# Patient Record
Sex: FEMALE | Race: WHITE | NOT HISPANIC OR LATINO
[De-identification: names, ages, dates, MRNs, and addresses within clinical notes are randomized per-mention and may not be internally consistent; named-entity substitution may affect disease eponyms.]

---

## 2017-03-02 ENCOUNTER — CLINICAL ADVICE (OUTPATIENT)
Age: 77
End: 2017-03-02

## 2017-04-08 ENCOUNTER — INPATIENT (INPATIENT)
Facility: HOSPITAL | Age: 77
LOS: 1 days | Discharge: ANOTHER HOSPITAL | End: 2017-04-10
Attending: HOSPITALIST | Admitting: HOSPITALIST
Payer: MEDICARE

## 2017-04-08 VITALS
OXYGEN SATURATION: 99 % | SYSTOLIC BLOOD PRESSURE: 105 MMHG | DIASTOLIC BLOOD PRESSURE: 48 MMHG | TEMPERATURE: 98 F | HEART RATE: 128 BPM | RESPIRATION RATE: 20 BRPM

## 2017-04-08 DIAGNOSIS — C54.1 MALIGNANT NEOPLASM OF ENDOMETRIUM: ICD-10-CM

## 2017-04-08 DIAGNOSIS — Z71.89 OTHER SPECIFIED COUNSELING: ICD-10-CM

## 2017-04-08 DIAGNOSIS — D63.0 ANEMIA IN NEOPLASTIC DISEASE: ICD-10-CM

## 2017-04-08 DIAGNOSIS — A41.9 SEPSIS, UNSPECIFIED ORGANISM: ICD-10-CM

## 2017-04-08 DIAGNOSIS — Z78.9 OTHER SPECIFIED HEALTH STATUS: Chronic | ICD-10-CM

## 2017-04-08 DIAGNOSIS — E34.9 ENDOCRINE DISORDER, UNSPECIFIED: ICD-10-CM

## 2017-04-08 DIAGNOSIS — G93.40 ENCEPHALOPATHY, UNSPECIFIED: ICD-10-CM

## 2017-04-08 DIAGNOSIS — R00.0 TACHYCARDIA, UNSPECIFIED: ICD-10-CM

## 2017-04-08 LAB
ALBUMIN SERPL ELPH-MCNC: 3 G/DL — LOW (ref 3.3–5)
ALP SERPL-CCNC: 133 U/L — HIGH (ref 40–120)
ALT FLD-CCNC: 17 U/L — SIGNIFICANT CHANGE UP (ref 4–33)
APPEARANCE UR: CLEAR — SIGNIFICANT CHANGE UP
AST SERPL-CCNC: 55 U/L — HIGH (ref 4–32)
BASE EXCESS BLDV CALC-SCNC: 2.6 MMOL/L — SIGNIFICANT CHANGE UP
BASOPHILS # BLD AUTO: 0 K/UL — SIGNIFICANT CHANGE UP (ref 0–0.2)
BASOPHILS NFR BLD AUTO: 0 % — SIGNIFICANT CHANGE UP (ref 0–2)
BASOPHILS NFR SPEC: 0 % — SIGNIFICANT CHANGE UP (ref 0–2)
BILIRUB SERPL-MCNC: 0.4 MG/DL — SIGNIFICANT CHANGE UP (ref 0.2–1.2)
BILIRUB UR-MCNC: NEGATIVE — SIGNIFICANT CHANGE UP
BLOOD GAS VENOUS - CREATININE: 0.43 MG/DL — LOW (ref 0.5–1.3)
BLOOD UR QL VISUAL: HIGH
BUN SERPL-MCNC: 15 MG/DL — SIGNIFICANT CHANGE UP (ref 7–23)
CA-I BLD-SCNC: 1.13 MMOL/L — SIGNIFICANT CHANGE UP (ref 1.03–1.23)
CALCIUM SERPL-MCNC: 9.7 MG/DL — SIGNIFICANT CHANGE UP (ref 8.4–10.5)
CHLORIDE BLDV-SCNC: 106 MMOL/L — SIGNIFICANT CHANGE UP (ref 96–108)
CHLORIDE SERPL-SCNC: 97 MMOL/L — LOW (ref 98–107)
CO2 SERPL-SCNC: 22 MMOL/L — SIGNIFICANT CHANGE UP (ref 22–31)
COLOR SPEC: SIGNIFICANT CHANGE UP
CREAT SERPL-MCNC: 0.46 MG/DL — LOW (ref 0.5–1.3)
EOSINOPHIL # BLD AUTO: 0.01 K/UL — SIGNIFICANT CHANGE UP (ref 0–0.5)
EOSINOPHIL NFR BLD AUTO: 0.1 % — SIGNIFICANT CHANGE UP (ref 0–6)
EOSINOPHIL NFR FLD: 0 % — SIGNIFICANT CHANGE UP (ref 0–6)
GAS PNL BLDV: 129 MMOL/L — LOW (ref 136–146)
GIANT PLATELETS BLD QL SMEAR: PRESENT — SIGNIFICANT CHANGE UP
GLUCOSE BLDV-MCNC: 108 — HIGH (ref 70–99)
GLUCOSE SERPL-MCNC: 102 MG/DL — HIGH (ref 70–99)
GLUCOSE UR-MCNC: NEGATIVE — SIGNIFICANT CHANGE UP
HCO3 BLDV-SCNC: 27 MMOL/L — SIGNIFICANT CHANGE UP (ref 20–27)
HCT VFR BLD CALC: 25.8 % — LOW (ref 34.5–45)
HCT VFR BLDV CALC: 27.4 % — LOW (ref 34.5–45)
HGB BLD-MCNC: 8.8 G/DL — LOW (ref 11.5–15.5)
HGB BLDV-MCNC: 8.8 G/DL — LOW (ref 11.5–15.5)
IMM GRANULOCYTES NFR BLD AUTO: 0.2 % — SIGNIFICANT CHANGE UP (ref 0–1.5)
KETONES UR-MCNC: NEGATIVE — SIGNIFICANT CHANGE UP
LACTATE BLDV-MCNC: 1.7 MMOL/L — SIGNIFICANT CHANGE UP (ref 0.5–2)
LACTATE SERPL-SCNC: 1.5 MMOL/L — SIGNIFICANT CHANGE UP (ref 0.5–2)
LEUKOCYTE ESTERASE UR-ACNC: NEGATIVE — SIGNIFICANT CHANGE UP
LYMPHOCYTES # BLD AUTO: 0.4 K/UL — LOW (ref 1–3.3)
LYMPHOCYTES # BLD AUTO: 4.7 % — LOW (ref 13–44)
LYMPHOCYTES NFR SPEC AUTO: 3 % — LOW (ref 13–44)
MACROCYTES BLD QL: SLIGHT — SIGNIFICANT CHANGE UP
MAGNESIUM SERPL-MCNC: 2.1 MG/DL — SIGNIFICANT CHANGE UP (ref 1.6–2.6)
MANUAL SMEAR VERIFICATION: SIGNIFICANT CHANGE UP
MCHC RBC-ENTMCNC: 31.3 PG — SIGNIFICANT CHANGE UP (ref 27–34)
MCHC RBC-ENTMCNC: 34.1 % — SIGNIFICANT CHANGE UP (ref 32–36)
MCV RBC AUTO: 91.8 FL — SIGNIFICANT CHANGE UP (ref 80–100)
METAMYELOCYTES # FLD: 2 % — HIGH (ref 0–1)
MONOCYTES # BLD AUTO: 0.06 K/UL — SIGNIFICANT CHANGE UP (ref 0–0.9)
MONOCYTES NFR BLD AUTO: 0.7 % — LOW (ref 2–14)
MONOCYTES NFR BLD: 1 % — LOW (ref 2–9)
MUCOUS THREADS # UR AUTO: SIGNIFICANT CHANGE UP
MYELOCYTES NFR BLD: 1 % — HIGH (ref 0–0)
NEUTROPHIL AB SER-ACNC: 91 % — HIGH (ref 43–77)
NEUTROPHILS # BLD AUTO: 8.09 K/UL — HIGH (ref 1.8–7.4)
NEUTROPHILS NFR BLD AUTO: 94.3 % — HIGH (ref 43–77)
NEUTS BAND # BLD: 2 % — SIGNIFICANT CHANGE UP (ref 0–6)
NITRITE UR-MCNC: NEGATIVE — SIGNIFICANT CHANGE UP
PCO2 BLDV: 35 MMHG — LOW (ref 41–51)
PH BLDV: 7.49 PH — HIGH (ref 7.32–7.43)
PH UR: 7 — SIGNIFICANT CHANGE UP (ref 4.6–8)
PHOSPHATE SERPL-MCNC: 1.5 MG/DL — LOW (ref 2.5–4.5)
PLATELET # BLD AUTO: 361 K/UL — SIGNIFICANT CHANGE UP (ref 150–400)
PLATELET CLUMP BLD QL SMEAR: SIGNIFICANT CHANGE UP
PLATELET COUNT - ESTIMATE: NORMAL — SIGNIFICANT CHANGE UP
PMV BLD: 10.5 FL — SIGNIFICANT CHANGE UP (ref 7–13)
PO2 BLDV: 48 MMHG — HIGH (ref 35–40)
POTASSIUM BLDV-SCNC: 3.8 MMOL/L — SIGNIFICANT CHANGE UP (ref 3.4–4.5)
POTASSIUM SERPL-MCNC: 4.8 MMOL/L — SIGNIFICANT CHANGE UP (ref 3.5–5.3)
POTASSIUM SERPL-SCNC: 4.8 MMOL/L — SIGNIFICANT CHANGE UP (ref 3.5–5.3)
PROT SERPL-MCNC: 6.2 G/DL — SIGNIFICANT CHANGE UP (ref 6–8.3)
PROT UR-MCNC: NEGATIVE — SIGNIFICANT CHANGE UP
PTH-INTACT SERPL-MCNC: 102.6 PG/ML — HIGH (ref 15–65)
RBC # BLD: 2.81 M/UL — LOW (ref 3.8–5.2)
RBC # FLD: 16.5 % — HIGH (ref 10.3–14.5)
RBC CASTS # UR COMP ASSIST: HIGH (ref 0–?)
SAO2 % BLDV: 85.4 % — HIGH (ref 60–85)
SODIUM SERPL-SCNC: 137 MMOL/L — SIGNIFICANT CHANGE UP (ref 135–145)
SP GR SPEC: 1.01 — SIGNIFICANT CHANGE UP (ref 1–1.03)
TSH SERPL-MCNC: 2.03 UIU/ML — SIGNIFICANT CHANGE UP (ref 0.27–4.2)
UROBILINOGEN FLD QL: NORMAL E.U. — SIGNIFICANT CHANGE UP (ref 0.1–0.2)
WBC # BLD: 8.58 K/UL — SIGNIFICANT CHANGE UP (ref 3.8–10.5)
WBC # FLD AUTO: 8.58 K/UL — SIGNIFICANT CHANGE UP (ref 3.8–10.5)
WBC UR QL: SIGNIFICANT CHANGE UP (ref 0–?)

## 2017-04-08 PROCEDURE — 71010: CPT | Mod: 26

## 2017-04-08 PROCEDURE — 99223 1ST HOSP IP/OBS HIGH 75: CPT | Mod: GC

## 2017-04-08 RX ORDER — POLYETHYLENE GLYCOL 3350 17 G/17G
17 POWDER, FOR SOLUTION ORAL
Qty: 0 | Refills: 0 | COMMUNITY

## 2017-04-08 RX ORDER — VANCOMYCIN HCL 1 G
1000 VIAL (EA) INTRAVENOUS ONCE
Qty: 0 | Refills: 0 | Status: COMPLETED | OUTPATIENT
Start: 2017-04-08 | End: 2017-04-08

## 2017-04-08 RX ORDER — SODIUM CHLORIDE 9 MG/ML
2000 INJECTION INTRAMUSCULAR; INTRAVENOUS; SUBCUTANEOUS ONCE
Qty: 0 | Refills: 0 | Status: COMPLETED | OUTPATIENT
Start: 2017-04-08 | End: 2017-04-08

## 2017-04-08 RX ORDER — PANTOPRAZOLE SODIUM 20 MG/1
1 TABLET, DELAYED RELEASE ORAL
Qty: 0 | Refills: 0 | COMMUNITY

## 2017-04-08 RX ORDER — SODIUM CHLORIDE 9 MG/ML
1000 INJECTION, SOLUTION INTRAVENOUS
Qty: 0 | Refills: 0 | Status: DISCONTINUED | OUTPATIENT
Start: 2017-04-08 | End: 2017-04-09

## 2017-04-08 RX ORDER — MORPHINE SULFATE 50 MG/1
0 CAPSULE, EXTENDED RELEASE ORAL
Qty: 0 | Refills: 0 | COMMUNITY

## 2017-04-08 RX ORDER — FENTANYL CITRATE 50 UG/ML
1 INJECTION INTRAVENOUS
Qty: 0 | Refills: 0 | Status: DISCONTINUED | OUTPATIENT
Start: 2017-04-08 | End: 2017-04-09

## 2017-04-08 RX ORDER — DOCUSATE SODIUM 100 MG
25 CAPSULE ORAL
Qty: 0 | Refills: 0 | COMMUNITY

## 2017-04-08 RX ORDER — SODIUM CHLORIDE 9 MG/ML
500 INJECTION INTRAMUSCULAR; INTRAVENOUS; SUBCUTANEOUS ONCE
Qty: 0 | Refills: 0 | Status: COMPLETED | OUTPATIENT
Start: 2017-04-08 | End: 2017-04-08

## 2017-04-08 RX ORDER — PROCHLORPERAZINE MALEATE 5 MG
1 TABLET ORAL
Qty: 0 | Refills: 0 | COMMUNITY

## 2017-04-08 RX ORDER — SENNA PLUS 8.6 MG/1
1 TABLET ORAL
Qty: 0 | Refills: 0 | COMMUNITY

## 2017-04-08 RX ORDER — FENTANYL CITRATE 50 UG/ML
1 INJECTION INTRAVENOUS
Qty: 0 | Refills: 0 | Status: DISCONTINUED | OUTPATIENT
Start: 2017-04-08 | End: 2017-04-10

## 2017-04-08 RX ORDER — ONDANSETRON 8 MG/1
1 TABLET, FILM COATED ORAL
Qty: 0 | Refills: 0 | COMMUNITY

## 2017-04-08 RX ORDER — PIPERACILLIN AND TAZOBACTAM 4; .5 G/20ML; G/20ML
3.38 INJECTION, POWDER, LYOPHILIZED, FOR SOLUTION INTRAVENOUS EVERY 8 HOURS
Qty: 0 | Refills: 0 | Status: DISCONTINUED | OUTPATIENT
Start: 2017-04-08 | End: 2017-04-10

## 2017-04-08 RX ORDER — ACETAMINOPHEN 500 MG
650 TABLET ORAL EVERY 6 HOURS
Qty: 0 | Refills: 0 | Status: DISCONTINUED | OUTPATIENT
Start: 2017-04-08 | End: 2017-04-10

## 2017-04-08 RX ORDER — PIPERACILLIN AND TAZOBACTAM 4; .5 G/20ML; G/20ML
3.38 INJECTION, POWDER, LYOPHILIZED, FOR SOLUTION INTRAVENOUS ONCE
Qty: 0 | Refills: 0 | Status: COMPLETED | OUTPATIENT
Start: 2017-04-08 | End: 2017-04-09

## 2017-04-08 RX ORDER — ACETAMINOPHEN 500 MG
2 TABLET ORAL
Qty: 0 | Refills: 0 | COMMUNITY

## 2017-04-08 RX ORDER — MEROPENEM 1 G/30ML
1000 INJECTION INTRAVENOUS ONCE
Qty: 0 | Refills: 0 | Status: COMPLETED | OUTPATIENT
Start: 2017-04-08 | End: 2017-04-08

## 2017-04-08 RX ORDER — VANCOMYCIN HCL 1 G
1000 VIAL (EA) INTRAVENOUS EVERY 12 HOURS
Qty: 0 | Refills: 0 | Status: DISCONTINUED | OUTPATIENT
Start: 2017-04-09 | End: 2017-04-10

## 2017-04-08 RX ORDER — ENOXAPARIN SODIUM 100 MG/ML
40 INJECTION SUBCUTANEOUS EVERY 24 HOURS
Qty: 0 | Refills: 0 | Status: DISCONTINUED | OUTPATIENT
Start: 2017-04-08 | End: 2017-04-08

## 2017-04-08 RX ADMIN — Medication 63.75 MILLIMOLE(S): at 21:40

## 2017-04-08 RX ADMIN — Medication 250 MILLIGRAM(S): at 20:36

## 2017-04-08 RX ADMIN — MEROPENEM 200 MILLIGRAM(S): 1 INJECTION INTRAVENOUS at 19:42

## 2017-04-08 RX ADMIN — SODIUM CHLORIDE 500 MILLILITER(S): 9 INJECTION INTRAMUSCULAR; INTRAVENOUS; SUBCUTANEOUS at 22:06

## 2017-04-08 RX ADMIN — SODIUM CHLORIDE 4000 MILLILITER(S): 9 INJECTION INTRAMUSCULAR; INTRAVENOUS; SUBCUTANEOUS at 19:27

## 2017-04-08 NOTE — ED PROVIDER NOTE - ATTENDING CONTRIBUTION TO CARE
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  Pt noted to be more unresponsive than usual this evening and febrile at home.  Pt with L upper chest port and indwelling alanis.  Family reports decreased UOP yesterday, but normal today.  Pt with waxing and waning delirium since her admission, but today had a period where she was not responding to voice or physical stimuli.  Pt received 300cc IVFs by EMS and is now awake and at her baseline MS.  No rash, apparent pain, vomiting, diarrhea.  Pt with cough at home.  Thin, ill appearing, lying comfortably in stretcher, awake mildly agitated, babbling incoherently, moving all extremities.  Febrile, tachy, normotensive.  Lungs cta bl with no increased work of breathing.  Cards nl S1/S2, tachy regular, no MRG.  Abd soft ntnd. (+)alanis in place.  No pedal edema or calf tenderness.  Septic, r/o neutropenic sepsis eval for pna vs uti, labs, ua, cxr, ivfs, broad spectrum abx, admit.

## 2017-04-08 NOTE — H&P ADULT. - PROBLEM SELECTOR PLAN 6
- likely secondary to underlying infection  - Well's Score for PE 4 with 16.2% chance of PE in ED population  - low threshold to obtain lower extremity dopplers/CT-Chest to rule out PE - likely secondary to underlying infection  - Well's Score for PE 4 with 16.2% chance of PE in ED population  - CT-Chest w contrast to rule out PE

## 2017-04-08 NOTE — H&P ADULT. - PROBLEM SELECTOR PLAN 3
- likely multifactorial (sepsis +/- leptomeningeal disease +/- metabolic encephalopathy)   - NPO for now   - Fall Precautions   - hold anti-psychotics - likely multifactorial (sepsis +/- leptomeningeal disease +/- metabolic encephalopathy)   - CT-Head Non-Contrast   - MRI-Head w/wo contrast to better evaluate brain metastases   - will consider EEG to rule out non-convulsive seizures   - NPO for now   - Fall Precautions   - hold anti-psychotics - likely multifactorial (sepsis +/- leptomeningeal disease +/- metabolic encephalopathy)   - CT-Head Non-Contrast   - MRI-Head w/wo contrast to better evaluate brain metastases   - will consider EEG to rule out non-convulsive seizures if other workup unrevealing  - NPO for now   - Fall Precautions   - hold anti-psychotics  - hold pharmacologic anti-coagulation given brain mets (increased risk of intracranial bleed...despite underlying malignancy and risk of VTE)

## 2017-04-08 NOTE — ED ADULT NURSE NOTE - CHIEF COMPLAINT QUOTE
pt brought in by ems/  family called because pt was unresponsive . pt has brain cancer and was recently treated with chem/ pt found to have scant amt of urine from [facility] last time this happened her calium was high pt has 20 g inleft  pt responsive to family

## 2017-04-08 NOTE — H&P ADULT. - PROBLEM SELECTOR PLAN 7
- will clarify patient's prognosis - will clarify patient's prognosis with Gyn Onc  - Palliative Care Team consult in AM

## 2017-04-08 NOTE — H&P ADULT. - FAMILY HISTORY
No pertinent family history in first degree relatives Sibling  Still living? Unknown  Family history of lymphoma, Age at diagnosis: Age Unknown

## 2017-04-08 NOTE — ED PROVIDER NOTE - OBJECTIVE STATEMENT
76F w/ recently diagnosed endometrial carcinoma w/ mets to lungs and brain, was discharged from [facility] 4 days ago. She was there for a month getting whole brain irradiation and chemo, last chemo 4/5. No infectious complications at [facility] but she had hypercalcemia of unknown etiology and received a bisphosphonate and calcitonin. Also was told she has pulm HTN. Has a port and alanis.  Since d/c was ok until 2 days ago when family noticed increasing confusion and decreased LOC, decreased PO intake. Possible dry cough for a day. Son is a nephrologist at [employer]. Family called EMS who found her BP to be about 80s systolic

## 2017-04-08 NOTE — H&P ADULT. - LAB RESULTS AND INTERPRETATION
Labs personally reviewed showing absence of leukocytosis (however neutrophil predominance, without bandemia); normocytic anemia; BMP notable for hypoalbuminemia (Alb 3); Alk Phos 133, AST 55; PTH-intact 102.6; Phos 1.5; lactate 1.7; UA negative nitrites/leukocyte esterase + trace blood

## 2017-04-08 NOTE — H&P ADULT. - COMMENTS
VS: T100.1, , /68, RR16, SpO2 98% on room air ROS could not be gathered; patient is currently somnolent, uncooperative with  history and physical.

## 2017-04-08 NOTE — H&P ADULT. - PROBLEM SELECTOR PLAN 2
- will follow-up with patient's Gyn Oncologist (Dr. Wojciech Cartwright - [facility]) in am   - consult in-house Gyn Onc - will follow-up with patient's Gyn Oncologist (Dr. Wojciech Cartwright - [facility]) in am   - consult in-house Gyn Onc in am  - obtain imaging, biopsy and treatment records from [facility]

## 2017-04-08 NOTE — H&P ADULT. - PMH
Endometrial carcinoma    Hypercalcemia of malignancy    Pulmonary hypertension Endometrial carcinoma    Hypercalcemia of malignancy    Osteoporosis    Pubic ramus fracture    Pulmonary hypertension

## 2017-04-08 NOTE — H&P ADULT. - PROBLEM SELECTOR PLAN 5
- likely anemia of chronic disease  - check iron studies (Fe, ferritin, TIBC, B12) in am   - continue to monitor CBC w/ differential daily (transfuse for Hg goal > 7)

## 2017-04-08 NOTE — ED ADULT TRIAGE NOTE - CHIEF COMPLAINT QUOTE
pt brought in by ems/  family called because pt was unresponsive . pt has brain cancer and was recently treated with chem/ pt found to have scant amt of urine from [facility] last time this happened her calium was high pt has 20 g inleft pt brought in by ems/  family called because pt was unresponsive . pt has brain cancer and was recently treated with chem/ pt found to have scant amt of urine from [facility] last time this happened her calium was high pt has 20 g inleft  pt responsive to family

## 2017-04-08 NOTE — H&P ADULT. - HISTORY OF PRESENT ILLNESS
On arrival to ED, VS: Tmax 100.1, -128, -134/48-68, RR16-20, SpO2 98% on room air. She received Meropenem 1000mg, Vancomycin 1000mg, NS 2L Ms. 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 Per the patient's son, who is at bedside, she was in her usual state of health until she sustained bilateral pubic rami fracture 3 months ago while in [place]. There, preliminary workup revealed that she had an endometrial mass, with biopsy showing poorly differentiated adenocarcinoma. She was hypoxic and tachycardic while hospitalized in [place], however per son, CTA-chest was negative for pulmonary embolism...CT, however did show pulmonary (karen-hilar) and multiple brain metastases. She did not undergo chemotherapy or radiation therapy while in [place]. She was transferred directly to [facility] approximately 6 weeks ago. While at [facility], she had repeat biopsy confirming poorly differentiated adenocarcinoma of the uterus. Patient was started on whole-brain radiotherapy (15 cycles) and started on a platinum-based chemotherapeutic regimen (completed 2 cycles). Patient's hospital course was complicated by hypercalcemia (Ca 17.6), attributed to her underlying malignancy, which resolved with Zometa and Calcitonin. Per son, patient was conversant, AOx3, ambulated with a walker prior to the start of chemotherapy. Since, she has had worsening somnolence/lethargy and was started on Zyprexa for delirium. The patient was discharged home 3 days ago, and has since been very lethargic, not eating much more than Ensures, now with dysarthria. Per son, patient has had indwelling alanis catheter since her admission to [facility] (with multiple failed trials of void). She was scheduled to received NS 1L three times weekly via the mediport (coordinated by infusion company). Her mediport was apparently accessed by the home health aide a few days ago. She has not had any nausea/vomiting. No diarrhea/hematochezia/melena. Her son has noticed a non-productive cough. Otherwise, no other focal infectious symptoms.     Patient is currently alert, but somnolent, uncooperative with history and physical.     On arrival to ED, VS: Tmax 100.1, -128, -134/48-68, RR16-20, SpO2 98% on room air. She received Meropenem 1000mg, Vancomycin 1000mg, NS 2.5L (in addition to the 1L she received by EMS).

## 2017-04-08 NOTE — H&P ADULT. - ASSESSMENT
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 CNS infection, less likely is UTI given clean UA) vs. sequela of radiation therapy to the brain vs. metastatic disease to the brain.

## 2017-04-08 NOTE — ED PROVIDER NOTE - MEDICAL DECISION MAKING DETAILS
76F w/ met endometrial ca on chemo presents with AMS and sepsis, suspect urinary source. Given fluids and abx per protocol. Cultures done before abx. Will adm to medicine.

## 2017-04-08 NOTE — H&P ADULT. - PROBLEM SELECTOR PLAN 4
- check PTH-rp   - continue to monitor daily BMP/Phos/Mg - check PTH-rp , Vitamin 25(OH) D   - continue to monitor daily BMP/Phos/Mg

## 2017-04-08 NOTE — H&P ADULT. - PROBLEM SELECTOR PLAN 1
- presumed diagnosis of underlying infection, requiring IVF resuscitation   - continue with broad-spectrum antibiotics (Vancomycin + Zosyn)  - check RVP   - follow-up BCx/UCx  - will consider LP - presumed diagnosis of underlying infection, requiring IVF resuscitation   - continue with broad-spectrum antibiotics (Vancomycin + Zosyn)  - check RVP   - follow-up BCx/UCx; check cultures from mediport  - will consider LP  - D5/NS @ 50ml/hr

## 2017-04-08 NOTE — H&P ADULT. - ATTENDING COMMENTS
Patient was seen and evaluated, agree with above    A/P: 76 y.o. woman with multiple medical co-morbidities now with sepsis of unclear etiology   - Continue with broad spectrum abx    # Hypophosphatemia    - Will add sodium phosphorus.

## 2017-04-09 LAB
ALBUMIN SERPL ELPH-MCNC: 3 G/DL — LOW (ref 3.3–5)
ALP SERPL-CCNC: 124 U/L — HIGH (ref 40–120)
ALT FLD-CCNC: 15 U/L — SIGNIFICANT CHANGE UP (ref 4–33)
APTT BLD: 29.7 SEC — SIGNIFICANT CHANGE UP (ref 27.5–37.4)
AST SERPL-CCNC: 37 U/L — HIGH (ref 4–32)
B PERT DNA SPEC QL NAA+PROBE: SIGNIFICANT CHANGE UP
BASOPHILS # BLD AUTO: 0.02 K/UL — SIGNIFICANT CHANGE UP (ref 0–0.2)
BASOPHILS NFR BLD AUTO: 0.4 % — SIGNIFICANT CHANGE UP (ref 0–2)
BILIRUB SERPL-MCNC: 0.7 MG/DL — SIGNIFICANT CHANGE UP (ref 0.2–1.2)
BUN SERPL-MCNC: 8 MG/DL — SIGNIFICANT CHANGE UP (ref 7–23)
C PNEUM DNA SPEC QL NAA+PROBE: NOT DETECTED — SIGNIFICANT CHANGE UP
CALCIUM SERPL-MCNC: 8.7 MG/DL — SIGNIFICANT CHANGE UP (ref 8.4–10.5)
CHLORIDE SERPL-SCNC: 99 MMOL/L — SIGNIFICANT CHANGE UP (ref 98–107)
CO2 SERPL-SCNC: 23 MMOL/L — SIGNIFICANT CHANGE UP (ref 22–31)
CREAT SERPL-MCNC: 0.33 MG/DL — LOW (ref 0.5–1.3)
EOSINOPHIL # BLD AUTO: 0.03 K/UL — SIGNIFICANT CHANGE UP (ref 0–0.5)
EOSINOPHIL NFR BLD AUTO: 0.6 % — SIGNIFICANT CHANGE UP (ref 0–6)
FERRITIN SERPL-MCNC: 2581 NG/ML — HIGH (ref 15–150)
FLUAV H1 2009 PAND RNA SPEC QL NAA+PROBE: NOT DETECTED — SIGNIFICANT CHANGE UP
FLUAV H1 RNA SPEC QL NAA+PROBE: NOT DETECTED — SIGNIFICANT CHANGE UP
FLUAV H3 RNA SPEC QL NAA+PROBE: NOT DETECTED — SIGNIFICANT CHANGE UP
FLUAV SUBTYP SPEC NAA+PROBE: SIGNIFICANT CHANGE UP
FLUBV RNA SPEC QL NAA+PROBE: NOT DETECTED — SIGNIFICANT CHANGE UP
GLUCOSE SERPL-MCNC: 90 MG/DL — SIGNIFICANT CHANGE UP (ref 70–99)
HADV DNA SPEC QL NAA+PROBE: NOT DETECTED — SIGNIFICANT CHANGE UP
HCOV 229E RNA SPEC QL NAA+PROBE: NOT DETECTED — SIGNIFICANT CHANGE UP
HCOV HKU1 RNA SPEC QL NAA+PROBE: NOT DETECTED — SIGNIFICANT CHANGE UP
HCOV NL63 RNA SPEC QL NAA+PROBE: NOT DETECTED — SIGNIFICANT CHANGE UP
HCOV OC43 RNA SPEC QL NAA+PROBE: NOT DETECTED — SIGNIFICANT CHANGE UP
HCT VFR BLD CALC: 25 % — LOW (ref 34.5–45)
HGB BLD-MCNC: 8.4 G/DL — LOW (ref 11.5–15.5)
HMPV RNA SPEC QL NAA+PROBE: NOT DETECTED — SIGNIFICANT CHANGE UP
HPIV1 RNA SPEC QL NAA+PROBE: NOT DETECTED — SIGNIFICANT CHANGE UP
HPIV2 RNA SPEC QL NAA+PROBE: NOT DETECTED — SIGNIFICANT CHANGE UP
HPIV3 RNA SPEC QL NAA+PROBE: NOT DETECTED — SIGNIFICANT CHANGE UP
HPIV4 RNA SPEC QL NAA+PROBE: NOT DETECTED — SIGNIFICANT CHANGE UP
IMM GRANULOCYTES NFR BLD AUTO: 1.3 % — SIGNIFICANT CHANGE UP (ref 0–1.5)
INR BLD: 1.03 — SIGNIFICANT CHANGE UP (ref 0.88–1.17)
IRON SATN MFR SERPL: 151 UG/DL — SIGNIFICANT CHANGE UP (ref 140–530)
IRON SATN MFR SERPL: 72 UG/DL — SIGNIFICANT CHANGE UP (ref 30–160)
LACTATE SERPL-SCNC: 0.9 MMOL/L — SIGNIFICANT CHANGE UP (ref 0.5–2)
LYMPHOCYTES # BLD AUTO: 0.35 K/UL — LOW (ref 1–3.3)
LYMPHOCYTES # BLD AUTO: 7.3 % — LOW (ref 13–44)
M PNEUMO DNA SPEC QL NAA+PROBE: NOT DETECTED — SIGNIFICANT CHANGE UP
MAGNESIUM SERPL-MCNC: 1.8 MG/DL — SIGNIFICANT CHANGE UP (ref 1.6–2.6)
MCHC RBC-ENTMCNC: 30.9 PG — SIGNIFICANT CHANGE UP (ref 27–34)
MCHC RBC-ENTMCNC: 33.6 % — SIGNIFICANT CHANGE UP (ref 32–36)
MCV RBC AUTO: 91.9 FL — SIGNIFICANT CHANGE UP (ref 80–100)
MONOCYTES # BLD AUTO: 0.05 K/UL — SIGNIFICANT CHANGE UP (ref 0–0.9)
MONOCYTES NFR BLD AUTO: 1 % — LOW (ref 2–14)
NEUTROPHILS # BLD AUTO: 4.29 K/UL — SIGNIFICANT CHANGE UP (ref 1.8–7.4)
NEUTROPHILS NFR BLD AUTO: 89.4 % — HIGH (ref 43–77)
PHOSPHATE SERPL-MCNC: 2.1 MG/DL — LOW (ref 2.5–4.5)
PLATELET # BLD AUTO: 314 K/UL — SIGNIFICANT CHANGE UP (ref 150–400)
PMV BLD: 10.2 FL — SIGNIFICANT CHANGE UP (ref 7–13)
POTASSIUM SERPL-MCNC: 3.4 MMOL/L — LOW (ref 3.5–5.3)
POTASSIUM SERPL-SCNC: 3.4 MMOL/L — LOW (ref 3.5–5.3)
PROT SERPL-MCNC: 5.8 G/DL — LOW (ref 6–8.3)
PROTHROM AB SERPL-ACNC: 11.6 SEC — SIGNIFICANT CHANGE UP (ref 9.8–13.1)
RBC # BLD: 2.72 M/UL — LOW (ref 3.8–5.2)
RBC # FLD: 16.5 % — HIGH (ref 10.3–14.5)
RSV RNA SPEC QL NAA+PROBE: NOT DETECTED — SIGNIFICANT CHANGE UP
RV+EV RNA SPEC QL NAA+PROBE: NOT DETECTED — SIGNIFICANT CHANGE UP
SODIUM SERPL-SCNC: 139 MMOL/L — SIGNIFICANT CHANGE UP (ref 135–145)
SPECIMEN SOURCE: SIGNIFICANT CHANGE UP
SPECIMEN SOURCE: SIGNIFICANT CHANGE UP
UIBC SERPL-MCNC: 79 UG/DL — LOW (ref 110–370)
VIT B12 SERPL-MCNC: 1208 PG/ML — HIGH (ref 200–900)
WBC # BLD: 4.8 K/UL — SIGNIFICANT CHANGE UP (ref 3.8–10.5)
WBC # FLD AUTO: 4.8 K/UL — SIGNIFICANT CHANGE UP (ref 3.8–10.5)

## 2017-04-09 PROCEDURE — 70450 CT HEAD/BRAIN W/O DYE: CPT | Mod: 26

## 2017-04-09 PROCEDURE — 99233 SBSQ HOSP IP/OBS HIGH 50: CPT | Mod: GC

## 2017-04-09 RX ORDER — POTASSIUM CHLORIDE 20 MEQ
10 PACKET (EA) ORAL
Qty: 0 | Refills: 0 | Status: COMPLETED | OUTPATIENT
Start: 2017-04-09 | End: 2017-04-09

## 2017-04-09 RX ORDER — ENOXAPARIN SODIUM 100 MG/ML
40 INJECTION SUBCUTANEOUS DAILY
Qty: 0 | Refills: 0 | Status: DISCONTINUED | OUTPATIENT
Start: 2017-04-09 | End: 2017-04-10

## 2017-04-09 RX ORDER — ENOXAPARIN SODIUM 100 MG/ML
40 INJECTION SUBCUTANEOUS DAILY
Qty: 0 | Refills: 0 | Status: DISCONTINUED | OUTPATIENT
Start: 2017-04-09 | End: 2017-04-09

## 2017-04-09 RX ORDER — SODIUM CHLORIDE 9 MG/ML
1000 INJECTION, SOLUTION INTRAVENOUS
Qty: 0 | Refills: 0 | Status: DISCONTINUED | OUTPATIENT
Start: 2017-04-09 | End: 2017-04-10

## 2017-04-09 RX ADMIN — ENOXAPARIN SODIUM 40 MILLIGRAM(S): 100 INJECTION SUBCUTANEOUS at 15:10

## 2017-04-09 RX ADMIN — PIPERACILLIN AND TAZOBACTAM 25 GRAM(S): 4; .5 INJECTION, POWDER, LYOPHILIZED, FOR SOLUTION INTRAVENOUS at 15:11

## 2017-04-09 RX ADMIN — PIPERACILLIN AND TAZOBACTAM 25 GRAM(S): 4; .5 INJECTION, POWDER, LYOPHILIZED, FOR SOLUTION INTRAVENOUS at 23:18

## 2017-04-09 RX ADMIN — Medication 100 MILLIEQUIVALENT(S): at 12:56

## 2017-04-09 RX ADMIN — Medication 100 MILLIEQUIVALENT(S): at 15:10

## 2017-04-09 RX ADMIN — SODIUM CHLORIDE 50 MILLILITER(S): 9 INJECTION, SOLUTION INTRAVENOUS at 00:44

## 2017-04-09 RX ADMIN — Medication 250 MILLIGRAM(S): at 08:33

## 2017-04-09 RX ADMIN — Medication 250 MILLIGRAM(S): at 20:40

## 2017-04-09 RX ADMIN — PIPERACILLIN AND TAZOBACTAM 200 GRAM(S): 4; .5 INJECTION, POWDER, LYOPHILIZED, FOR SOLUTION INTRAVENOUS at 01:45

## 2017-04-09 RX ADMIN — PIPERACILLIN AND TAZOBACTAM 25 GRAM(S): 4; .5 INJECTION, POWDER, LYOPHILIZED, FOR SOLUTION INTRAVENOUS at 05:56

## 2017-04-09 RX ADMIN — FENTANYL CITRATE 1 PATCH: 50 INJECTION INTRAVENOUS at 00:44

## 2017-04-09 RX ADMIN — Medication 63.75 MILLIMOLE(S): at 08:12

## 2017-04-09 RX ADMIN — SODIUM CHLORIDE 100 MILLILITER(S): 9 INJECTION, SOLUTION INTRAVENOUS at 10:21

## 2017-04-09 RX ADMIN — SODIUM CHLORIDE 100 MILLILITER(S): 9 INJECTION, SOLUTION INTRAVENOUS at 20:35

## 2017-04-10 ENCOUNTER — TRANSCRIPTION ENCOUNTER (OUTPATIENT)
Age: 77
End: 2017-04-10

## 2017-04-10 VITALS
HEART RATE: 109 BPM | OXYGEN SATURATION: 98 % | RESPIRATION RATE: 18 BRPM | SYSTOLIC BLOOD PRESSURE: 125 MMHG | DIASTOLIC BLOOD PRESSURE: 62 MMHG | TEMPERATURE: 98 F

## 2017-04-10 LAB
24R-OH-CALCIDIOL SERPL-MCNC: 35.1 NG/ML — SIGNIFICANT CHANGE UP (ref 30–100)
24R-OH-CALCIDIOL SERPL-MCNC: 36.1 NG/ML — SIGNIFICANT CHANGE UP (ref 30–100)
BACTERIA BLD CULT: SIGNIFICANT CHANGE UP
BUN SERPL-MCNC: 5 MG/DL — LOW (ref 7–23)
CALCIUM SERPL-MCNC: 8.9 MG/DL — SIGNIFICANT CHANGE UP (ref 8.4–10.5)
CHLORIDE SERPL-SCNC: 101 MMOL/L — SIGNIFICANT CHANGE UP (ref 98–107)
CO2 SERPL-SCNC: 22 MMOL/L — SIGNIFICANT CHANGE UP (ref 22–31)
CREAT SERPL-MCNC: 0.29 MG/DL — LOW (ref 0.5–1.3)
GLUCOSE SERPL-MCNC: 99 MG/DL — SIGNIFICANT CHANGE UP (ref 70–99)
HCT VFR BLD CALC: 23.4 % — LOW (ref 34.5–45)
HGB BLD-MCNC: 8 G/DL — LOW (ref 11.5–15.5)
MAGNESIUM SERPL-MCNC: 1.7 MG/DL — SIGNIFICANT CHANGE UP (ref 1.6–2.6)
MCHC RBC-ENTMCNC: 31.1 PG — SIGNIFICANT CHANGE UP (ref 27–34)
MCHC RBC-ENTMCNC: 34.2 % — SIGNIFICANT CHANGE UP (ref 32–36)
MCV RBC AUTO: 91.1 FL — SIGNIFICANT CHANGE UP (ref 80–100)
PHOSPHATE SERPL-MCNC: 1.9 MG/DL — LOW (ref 2.5–4.5)
PLATELET # BLD AUTO: 315 K/UL — SIGNIFICANT CHANGE UP (ref 150–400)
PMV BLD: 10.4 FL — SIGNIFICANT CHANGE UP (ref 7–13)
POTASSIUM SERPL-MCNC: 3.6 MMOL/L — SIGNIFICANT CHANGE UP (ref 3.5–5.3)
POTASSIUM SERPL-SCNC: 3.6 MMOL/L — SIGNIFICANT CHANGE UP (ref 3.5–5.3)
RBC # BLD: 2.57 M/UL — LOW (ref 3.8–5.2)
RBC # FLD: 16 % — HIGH (ref 10.3–14.5)
SODIUM SERPL-SCNC: 137 MMOL/L — SIGNIFICANT CHANGE UP (ref 135–145)
SPECIMEN SOURCE: SIGNIFICANT CHANGE UP
TRANSFERRIN SERPL-MCNC: 133 MG/DL — LOW (ref 200–360)
VANCOMYCIN TROUGH SERPL-MCNC: 19.1 UG/ML — SIGNIFICANT CHANGE UP (ref 10–20)
WBC # BLD: 4.52 K/UL — SIGNIFICANT CHANGE UP (ref 3.8–10.5)
WBC # FLD AUTO: 4.52 K/UL — SIGNIFICANT CHANGE UP (ref 3.8–10.5)

## 2017-04-10 PROCEDURE — 99497 ADVNCD CARE PLAN 30 MIN: CPT

## 2017-04-10 PROCEDURE — 99233 SBSQ HOSP IP/OBS HIGH 50: CPT | Mod: GC

## 2017-04-10 RX ORDER — SODIUM CHLORIDE 9 MG/ML
1000 INJECTION, SOLUTION INTRAVENOUS
Qty: 0 | Refills: 0 | Status: DISCONTINUED | OUTPATIENT
Start: 2017-04-10 | End: 2017-04-10

## 2017-04-10 RX ORDER — MORPHINE SULFATE 50 MG/1
1 CAPSULE, EXTENDED RELEASE ORAL
Qty: 0 | Refills: 0 | COMMUNITY

## 2017-04-10 RX ORDER — FENTANYL CITRATE 50 UG/ML
1 INJECTION INTRAVENOUS
Qty: 0 | Refills: 0 | COMMUNITY

## 2017-04-10 RX ORDER — ONDANSETRON 8 MG/1
4 TABLET, FILM COATED ORAL EVERY 6 HOURS
Qty: 0 | Refills: 0 | Status: COMPLETED | OUTPATIENT
Start: 2017-04-10 | End: 2017-04-10

## 2017-04-10 RX ORDER — SENNA PLUS 8.6 MG/1
2 TABLET ORAL AT BEDTIME
Qty: 0 | Refills: 0 | Status: DISCONTINUED | OUTPATIENT
Start: 2017-04-10 | End: 2017-04-10

## 2017-04-10 RX ORDER — POLYETHYLENE GLYCOL 3350 17 G/17G
17 POWDER, FOR SOLUTION ORAL DAILY
Qty: 0 | Refills: 0 | Status: DISCONTINUED | OUTPATIENT
Start: 2017-04-10 | End: 2017-04-10

## 2017-04-10 RX ORDER — OLANZAPINE 15 MG/1
1 TABLET, FILM COATED ORAL
Qty: 0 | Refills: 0 | COMMUNITY

## 2017-04-10 RX ORDER — PHENYLEPHRINE-SHARK LIVER OIL-MINERAL OIL-PETROLATUM RECTAL OINTMENT
1 OINTMENT (GRAM) RECTAL
Qty: 0 | Refills: 0 | COMMUNITY

## 2017-04-10 RX ORDER — ACETAMINOPHEN 500 MG
650 TABLET ORAL EVERY 6 HOURS
Qty: 0 | Refills: 0 | Status: DISCONTINUED | OUTPATIENT
Start: 2017-04-10 | End: 2017-04-10

## 2017-04-10 RX ORDER — SPIRONOLACTONE 25 MG/1
1 TABLET, FILM COATED ORAL
Qty: 0 | Refills: 0 | COMMUNITY

## 2017-04-10 RX ORDER — DOCUSATE SODIUM 100 MG
100 CAPSULE ORAL THREE TIMES A DAY
Qty: 0 | Refills: 0 | Status: DISCONTINUED | OUTPATIENT
Start: 2017-04-10 | End: 2017-04-10

## 2017-04-10 RX ADMIN — ONDANSETRON 4 MILLIGRAM(S): 8 TABLET, FILM COATED ORAL at 13:06

## 2017-04-10 RX ADMIN — Medication 250 MILLIGRAM(S): at 09:42

## 2017-04-10 RX ADMIN — SODIUM CHLORIDE 100 MILLILITER(S): 9 INJECTION, SOLUTION INTRAVENOUS at 12:52

## 2017-04-10 RX ADMIN — Medication 650 MILLIGRAM(S): at 17:05

## 2017-04-10 RX ADMIN — ENOXAPARIN SODIUM 40 MILLIGRAM(S): 100 INJECTION SUBCUTANEOUS at 12:53

## 2017-04-10 RX ADMIN — Medication 650 MILLIGRAM(S): at 16:33

## 2017-04-10 RX ADMIN — PIPERACILLIN AND TAZOBACTAM 25 GRAM(S): 4; .5 INJECTION, POWDER, LYOPHILIZED, FOR SOLUTION INTRAVENOUS at 06:05

## 2017-04-10 NOTE — PHYSICAL THERAPY INITIAL EVALUATION ADULT - PERTINENT HX OF CURRENT PROBLEM, REHAB EVAL
she sustained bilateral pubic rami fracture 3 months ago while in [place]. There, preliminary workup reveale endometrial adenocarcinoma, pulmonary and multiple brain metastases. Conversant, AOx3, ambulated with a walker prior to the start of chemotherapy. Since, she has had worsening somnolence/lethargy and was started on Zyprexa for delirium. The patient was discharged home 3 days ago, and has since been very lethargic, decreased PO, now with dysarthria

## 2017-04-10 NOTE — PHYSICAL THERAPY INITIAL EVALUATION ADULT - GENERAL OBSERVATIONS, REHAB EVAL
Received and left supine in bed with IV and alanis catheter in place, call bell in reach, spouse, daughter, grandson and MD present.

## 2017-04-10 NOTE — DISCHARGE NOTE ADULT - MEDICATION SUMMARY - MEDICATIONS TO STOP TAKING
I will STOP taking the medications listed below when I get home from the hospital:    fentaNYL 25 mcg/hr transdermal film, extended release  -- 1 patch by transdermal patch every 72 hours; with 12mcg/hr patch for total of 37mcg/hr    morphine 10 mg/5 mL oral solution  -- 1 milliliter(s) by mouth every 4 hours, As Needed pain; MDD 6mL    OLANZapine 7.5 mg oral tablet  -- 1 tab(s) by mouth once a day

## 2017-04-10 NOTE — DISCHARGE NOTE ADULT - MEDICATION SUMMARY - MEDICATIONS TO TAKE
I will START or STAY ON the medications listed below when I get home from the hospital:    acetaminophen 325 mg oral tablet  -- 2 tab(s) by mouth every 6 hours, As Needed  -- Indication: For Pain    fentaNYL 12 mcg/hr transdermal film, extended release  -- 1 patch by transdermal patch every 72 hours  -- Indication: For Pain    ondansetron 4 mg oral tablet  -- 1 tab(s) by mouth every 4 hours, As Needed for 21 days  -- Indication: For nausea    prochlorperazine 10 mg oral tablet  -- 1 tab(s) by mouth every 6 hours, As Needed  -- Indication: For nausea    senna 17.2 mg oral tablet  -- 1 tab(s) by mouth once a day  -- Indication: For constipation    polyethylene glycol 3350 oral powder for reconstitution  -- 17 gram(s) by mouth once a day  -- Indication: For constipation    docusate sodium 60 mg/15 mL oral syrup  -- 25 milliliter(s) by mouth 3 times a day  -- Indication: For constipation    pantoprazole 40 mg oral delayed release tablet  -- 1 tab(s) by mouth once a day  -- Indication: For GERD

## 2017-04-10 NOTE — DISCHARGE NOTE ADULT - PLAN OF CARE
Resolution Two out of four bottles of blood culture were positive for strep viridans. It is not completely clear if this represents true infection or contamination. It was decided to discontinue antibiotics and do serial blood cultures for monitoring. Continue to do serial blood cultures. Likely multifactorial 2/2 narcotics, poor PO intake/dehydration, underlying disease/chemo and WBRT. Continue care per Oncologist and PMD

## 2017-04-10 NOTE — PHYSICAL THERAPY INITIAL EVALUATION ADULT - ADDITIONAL COMMENTS
Independent 6 weeks ago; As per daughter, ambulated last around March 23rd; Sat up last around March 31st.

## 2017-04-10 NOTE — DISCHARGE NOTE ADULT - MEDICATION SUMMARY - MEDICATIONS TO CHANGE
I will SWITCH the dose or number of times a day I take the medications listed below when I get home from the hospital:    fentaNYL 12 mcg/hr transdermal film, extended release  -- 1 patch by transdermal patch every 72 hours; with 25mcg/hr patch for total 37mcg/hr

## 2017-04-10 NOTE — DISCHARGE NOTE ADULT - PATIENT PORTAL LINK FT
“You can access the [employer] Patient Portal, offered by [facility], by registering with the following website: [email or web address]”

## 2017-04-10 NOTE — DISCHARGE NOTE ADULT - HOSPITAL COURSE
Ms. 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 Patient was recently diagnosed w endometrial ca and had long hospitalization course at [facility]. Course c/w severe hypercalcemia. Patient also received WBRT, Last dose of chemo was 5 days prior to this hospitalization. Per son, patient has been having waxing and waning mental status since start of treatment and at times has difficulty maintaining PO intake. Upon ED eval, patient was started on IV fluids and empiric antibiotics(vanco plus piperacillin/tazobactam).  Dose of fentanyl patch was reduced and PO morphine and Olanzapine was discontinued. Patient became more interactive and per family is currently at baseline.  CT head was negative for any acute events. Of note 2/4 bottles of BC drawn in ED came back positive for strep viridans. It was felt that this more likely represents contamination. It was decided to dc antibiotics and monitor patient w serial blood cultures. GOC was discussed and  declared that patient is DNR/DNI. Per family request, patient is being transferred to [place] for further care.

## 2017-04-10 NOTE — DISCHARGE NOTE ADULT - CARE PLAN
Principal Discharge DX:	Blood culture positive for microorganism  Goal:	Resolution  Instructions for follow-up, activity and diet:	Two out of four bottles of blood culture were positive for strep viridans. It is not completely clear if this represents true infection or contamination. It was decided to discontinue antibiotics and do serial blood cultures for monitoring. Continue to do serial blood cultures.  Secondary Diagnosis:	Delirium  Instructions for follow-up, activity and diet:	Likely multifactorial 2/2 narcotics, poor PO intake/dehydration, underlying disease/chemo and WBRT. Continue care per Oncologist and PMD

## 2017-04-13 LAB
-  AMPICILLIN: SIGNIFICANT CHANGE UP
-  CIPROFLOXACIN: SIGNIFICANT CHANGE UP
-  NITROFURANTOIN: SIGNIFICANT CHANGE UP
-  TETRACYCLINE: SIGNIFICANT CHANGE UP
-  VANCOMYCIN: SIGNIFICANT CHANGE UP
BACTERIA BLD CULT: SIGNIFICANT CHANGE UP
BACTERIA UR CULT: SIGNIFICANT CHANGE UP
METHOD TYPE: SIGNIFICANT CHANGE UP
ORGANISM # SPEC MICROSCOPIC CNT: SIGNIFICANT CHANGE UP
ORGANISM # SPEC MICROSCOPIC CNT: SIGNIFICANT CHANGE UP

## 2017-04-14 LAB
BACTERIA BLD CULT: SIGNIFICANT CHANGE UP
BACTERIA BLD CULT: SIGNIFICANT CHANGE UP
PTH RELATED PROT SERPL-MCNC: <1.1 ZZ — SIGNIFICANT CHANGE UP

## 2017-04-17 PROBLEM — C54.1 MALIGNANT NEOPLASM OF ENDOMETRIUM: Chronic | Status: ACTIVE | Noted: 2017-04-08

## 2017-04-17 PROBLEM — E83.52 HYPERCALCEMIA: Chronic | Status: ACTIVE | Noted: 2017-04-08

## 2017-04-17 PROBLEM — I27.2 OTHER SECONDARY PULMONARY HYPERTENSION: Chronic | Status: ACTIVE | Noted: 2017-04-08

## 2017-04-18 ENCOUNTER — APPOINTMENT (OUTPATIENT)
Dept: HOME HEALTH SERVICES | Facility: HOME HEALTH | Age: 77
End: 2017-04-18

## 2017-04-19 ENCOUNTER — APPOINTMENT (OUTPATIENT)
Dept: HOME HEALTH SERVICES | Facility: HOME HEALTH | Age: 77
End: 2017-04-19

## 2021-10-24 NOTE — PATIENT PROFILE ADULT. - ---BLOOD CULTURE SUBMITTED TO LABORATORY
Take Tylenol or ibuprofen every 8 hours as needed for fevers or pain.  Drink plenty of fluids to stay well-hydrated.  Please return to the emergency room for any worsening pain, fevers, weakness, dizziness, nausea, vomiting, difficulties breathing or any other concerns.  
yes

## 2022-06-21 NOTE — ED PROVIDER NOTE - NS ED NOTE AC HIGH RISK COUNTRIES
No See message from 6/17/22.  This call came in at 9:17 am today and pharmacy was called back already at 945am